# Patient Record
Sex: MALE | Race: WHITE | NOT HISPANIC OR LATINO | Employment: FULL TIME | URBAN - METROPOLITAN AREA
[De-identification: names, ages, dates, MRNs, and addresses within clinical notes are randomized per-mention and may not be internally consistent; named-entity substitution may affect disease eponyms.]

---

## 2020-09-03 ENCOUNTER — VIRTUAL VISIT (OUTPATIENT)
Dept: FAMILY MEDICINE | Facility: OTHER | Age: 30
End: 2020-09-03
Payer: COMMERCIAL

## 2020-09-03 PROCEDURE — 99421 OL DIG E/M SVC 5-10 MIN: CPT | Performed by: PHYSICIAN ASSISTANT

## 2020-09-03 NOTE — PROGRESS NOTES
"Date: 2020 09:02:18  Clinician: Pérez Kang  Clinician NPI: [id number]  Patient: Urbano Pereira  Patient : 1990  Patient Address: [address] [place] [address]  Patient Phone: [phone]  Visit Protocol: URI  Patient Summary:  Urbano is a 30 year old ( : 1990 ) male who initiated a Visit for cold, sinus infection, or influenza. When asked the question \"Please sign me up to receive news, health information and promotions from [employer].\", Urbano responded \"No\".    Urbano states his symptoms started gradually 7-9 days ago. After his symptoms started, they improved and then got worse again.   His symptoms consist of facial pain or pressure, nausea, wheezing, and a cough. He is experiencing mild difficulty breathing with activities but can speak normally in full sentences.   Symptom details     Cough: Urbano coughs almost every minute and his cough is not more bothersome at night. Phlegm comes into his throat when he coughs. He believes his cough is caused by post-nasal drip. The color of the phlegm is green and yellow.     Wheezing: Urbano has not ever been diagnosed with asthma. Additional wheezing details as reported by the patient (free text): Mainly when I exhale, I notice a wheeze and a rattle, like a deep vibration, in my chest. If I try to make it happen it makes me cough. The wheeze and rattle wasn't there when the cough started. It developed after approx. 6-7 days of having the cough. If I strain to do something there's wheezing and that deep vibration.       Facial pain or pressure: The facial pain or pressure feels worse when bending over or leaning forward.      Urbano denies having ear pain, anosmia, fever, vomiting, rhinitis, sore throat, teeth pain, ageusia, diarrhea, nasal congestion, headache, chills, malaise, and myalgias. He also denies taking antibiotic medication in the past month, having recent facial or sinus surgery in the past 60 days, and having a " sinus infection within the past year.   Precipitating events  He has not recently been exposed to someone with influenza. Urbano has been in close contact with the following high risk individuals: children under the age of 5.   Pertinent COVID-19 (Coronavirus) information  In the past 14 days, Urbano has not worked in a congregate living setting.   He either works or volunteers as a healthcare worker or a , or works or volunteers in a healthcare facility. 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   Urbano also has not lived in a congregate living setting in the past 14 days. He does not live with a healthcare worker.   Urbano has not had a close contact with a laboratory-confirmed COVID-19 patient within 14 days of symptom onset.   Since December 2019, Urbano and has not had upper respiratory infection or influenza-like illness. Has not been diagnosed with lab-confirmed COVID-19 test   Pertinent medical history  Urbano does not need a return to work/school note.   Weight: 265 lbs   Urbano does not smoke or use smokeless tobacco.   Additional information as reported by the patient (free text): I had a low grade fever initially when symptoms started but have not since day 2-3. I took a Covid swab test on Monday, 8/31 which was 5 days after the cough had begun. It came back negative. The cough has persisted and the wheeze and rattle began the day after the test.   Weight: 265 lbs  A synchronous phone visit was initiated by the provider for the following reason: wheezing and worsening symptoms    MEDICATIONS: Claritin RediTabs oral, Advil oral, Mucinex D Maximum Strength oral, ALLERGIES: NKDA  Clinician Response:  Dear Urbano,   Your symptoms show that you may have coronavirus (COVID-19). This illness can cause fever, cough and trouble breathing. Many people  "get a mild case and get better on their own. Some people can get very sick.  What should I do?  We would like to test you for this virus.   1. Please call [phone] to schedule your visit. Explain that you were referred by [facility] to have a COVID-19 test. Be ready to share your [facility] visit ID number.  The following will serve as your written order for this COVID Test, ordered by me, for the indication of suspected COVID [Z20.828]: The test will be ordered in [employer], our electronic health record, after you are scheduled. It will show as ordered and authorized by Daryl Brown MD.  Order: COVID-19 (Coronavirus) PCR for SYMPTOMATIC testing from [facility].      2. When it's time for your COVID test:  Stay at least 6 feet away from others. (If someone will drive you to your test, stay in the backseat, as far away from the  as you can.)   Cover your mouth and nose with a mask, tissue or washcloth.  Go straight to the testing site. Don't make any stops on the way there or back.      3.Starting now: Stay home and away from others (self-isolate) until:   You've had no fever---and no medicine that reduces fever---for one full day (24 hours). And...   Your other symptoms have gotten better. For example, your cough or breathing has improved. And...   At least 10 days have passed since your symptoms started.       During this time, don't leave the house except for testing or medical care.   Stay in your own room, even for meals. Use your own bathroom if you can.   Stay away from others in your home. No hugging, kissing or shaking hands. No visitors.  Don't go to work, school or anywhere else.    Clean \"high touch\" surfaces often (doorknobs, counters, handles, etc.). Use a household cleaning spray or wipes. You'll find a full list of  on the EPA website: www.epa.gov/pesticide-registration/list-n-disinfectants-use-against-sars-cov-2.   Cover your mouth and nose with a mask, tissue or washcloth to avoid spreading germs.  " Wash your hands and face often. Use soap and water.  Caregivers in these groups are at risk for severe illness due to COVID-19:  o People 65 years and older  o People who live in a nursing home or long-term care facility  o People with chronic disease (lung, heart, cancer, diabetes, kidney, liver, immunologic)  o People who have a weakened immune system, including those who:   Are in cancer treatment  Take medicine that weakens the immune system, such as corticosteroids  Had a bone marrow or organ transplant  Have an immune deficiency  Have poorly controlled HIV or AIDS  Are obese (body mass index of 40 or higher)  Smoke regularly   o Caregivers should wear gloves while washing dishes, handling laundry and cleaning bedrooms and bathrooms.  o Use caution when washing and drying laundry: Don't shake dirty laundry, and use the warmest water setting that you can.  o For more tips, go to www.cdc.gov/coronavirus/2019-ncov/downloads/10Things.pdf.    4.Sign up for [employer]. We know it's scary to hear that you might have COVID-19. We want to track your symptoms to make sure you're okay over the next 2 weeks. Please look for an email from [employer]---this is a free, online program that we'll use to keep in touch. To sign up, follow the link in the email. Learn more at [email or web address]  How can I take care of myself?   Get lots of rest. Drink extra fluids (unless a doctor has told you not to).   Take Tylenol (acetaminophen) for fever or pain. If you have liver or kidney problems, ask your family doctor if it's okay to take Tylenol.   Adults can take either:    650 mg (two 325 mg pills) every 4 to 6 hours, or...   1,000 mg (two 500 mg pills) every 8 hours as needed.    Note: Don't take more than 3,000 mg in one day. Acetaminophen is found in many medicines (both prescribed and over-the-counter medicines). Read all labels to be sure you don't take too much.   For children, check the Tylenol bottle for the  right dose. The dose is based on the child's age or weight.    If you have other health problems (like cancer, heart failure, an organ transplant or severe kidney disease): Call your specialty clinic if you don't feel better in the next 2 days.       Know when to call 911. Emergency warning signs include:    Trouble breathing or shortness of breath Pain or pressure in the chest that doesn't go away Feeling confused like you haven't felt before, or not being able to wake up Bluish-colored lips or face.  Where can I get more information?    [employer] -- About COVID-19: [email or web address]   CDC -- What to Do If You're Sick: www.cdc.gov/coronavirus/2019-ncov/about/steps-when-sick.html   [facility] -- Ending Home Isolation: www.cdc.gov/coronavirus/2019-ncov/hcp/disposition-in-home-patients.html   [facility] -- Caring for Someone: www.cdc.gov/coronavirus/2019-ncov/if-you-are-sick/care-for-someone.html   [facility] -- Interim Guidance for Hospital Discharge to Home: www.[facility].Critical access hospital.[place]./diseases/coronavirus/hcp/hospdischarge.pdf   [facility] clinical trials (COVID-19 research studies): clinicalaffairs.[facility]-clinical-trials    Below are the COVID-19 hotlines at the [place] [facility] ([facility]). Interpreters are available.    For health questions: Call 066-154-5552 or 1-501.994.9057 (7 a.m. to 7 p.m.) For questions about schools and childcare: Call 374-309-6745 or 1-408.682.8582 (7 a.m. to 7 p.m.)    Diagnosis: Wheezing  Diagnosis ICD: R06.2  Triage Notes: I reviewed the patient's history, verified their identity, and explained the Visit process.    Patient has productive cough and wheezing.   We have decided to try a course of antibiotics and inhaler.  If symptoms persist or worsen then getting seen in urgent care and retesting for covid seem warranted.  Synchronous Triage: phone, status: completed, duration: 47 seconds  Prescription: azithromycin (Zithromax) 500 mg oral tablet 3 tablet, 3 days  supply. Take 1 tablet by mouth 1 time per day for 3 days. Refills: 0, Refill as needed: no, Allow substitutions: yes  Prescription: albuterol sulfate (Proventil HFA) 90 mcg/actuation inhalation HFA aerosol inhaler 1 200 inhalation aerosol with adapter (proventil hfa or equivalent), 0 days supply. Inhale 2 puffs every 4 hours as needed. Refills: 0, Refill as needed: no, Allow substitutions: yes  Pharmacy: [facility] - [phone] - [address] [place], [place] [id number]

## 2024-04-20 ENCOUNTER — HOSPITAL ENCOUNTER (EMERGENCY)
Facility: HOSPITAL | Age: 34
Discharge: HOME | End: 2024-04-20
Attending: FAMILY MEDICINE | Admitting: FAMILY MEDICINE
Payer: COMMERCIAL

## 2024-04-20 ENCOUNTER — APPOINTMENT (OUTPATIENT)
Dept: CT IMAGING | Facility: HOSPITAL | Age: 34
End: 2024-04-20
Attending: FAMILY MEDICINE
Payer: COMMERCIAL

## 2024-04-20 ENCOUNTER — APPOINTMENT (OUTPATIENT)
Dept: MRI IMAGING | Facility: HOSPITAL | Age: 34
End: 2024-04-20
Attending: FAMILY MEDICINE
Payer: COMMERCIAL

## 2024-04-20 VITALS
SYSTOLIC BLOOD PRESSURE: 102 MMHG | WEIGHT: 237 LBS | BODY MASS INDEX: 29.47 KG/M2 | HEIGHT: 75 IN | RESPIRATION RATE: 18 BRPM | HEART RATE: 66 BPM | DIASTOLIC BLOOD PRESSURE: 59 MMHG | TEMPERATURE: 98.8 F | OXYGEN SATURATION: 97 %

## 2024-04-20 DIAGNOSIS — R20.2 PARESTHESIA: ICD-10-CM

## 2024-04-20 LAB
ANION GAP SERPL CALCULATED.3IONS-SCNC: 11 MMOL/L (ref 7–15)
APTT PPP: 26 SECONDS (ref 22–38)
BASOPHILS # BLD AUTO: 0 10E3/UL (ref 0–0.2)
BASOPHILS NFR BLD AUTO: 1 %
BUN SERPL-MCNC: 21.2 MG/DL (ref 6–20)
CALCIUM SERPL-MCNC: 9.3 MG/DL (ref 8.6–10)
CHLORIDE SERPL-SCNC: 103 MMOL/L (ref 98–107)
CREAT SERPL-MCNC: 0.83 MG/DL (ref 0.67–1.17)
DEPRECATED HCO3 PLAS-SCNC: 23 MMOL/L (ref 22–29)
EGFRCR SERPLBLD CKD-EPI 2021: >90 ML/MIN/1.73M2
EOSINOPHIL # BLD AUTO: 0.2 10E3/UL (ref 0–0.7)
EOSINOPHIL NFR BLD AUTO: 3 %
ERYTHROCYTE [DISTWIDTH] IN BLOOD BY AUTOMATED COUNT: 11.5 % (ref 10–15)
GLUCOSE BLDC GLUCOMTR-MCNC: 134 MG/DL (ref 70–99)
GLUCOSE SERPL-MCNC: 127 MG/DL (ref 70–99)
HCT VFR BLD AUTO: 40.4 % (ref 40–53)
HGB BLD-MCNC: 14 G/DL (ref 13.3–17.7)
HOLD SPECIMEN: NORMAL
IMM GRANULOCYTES # BLD: 0 10E3/UL
IMM GRANULOCYTES NFR BLD: 0 %
INR PPP: 0.95 (ref 0.85–1.15)
LYMPHOCYTES # BLD AUTO: 2 10E3/UL (ref 0.8–5.3)
LYMPHOCYTES NFR BLD AUTO: 30 %
MCH RBC QN AUTO: 30.8 PG (ref 26.5–33)
MCHC RBC AUTO-ENTMCNC: 34.7 G/DL (ref 31.5–36.5)
MCV RBC AUTO: 89 FL (ref 78–100)
MONOCYTES # BLD AUTO: 0.5 10E3/UL (ref 0–1.3)
MONOCYTES NFR BLD AUTO: 7 %
NEUTROPHILS # BLD AUTO: 3.9 10E3/UL (ref 1.6–8.3)
NEUTROPHILS NFR BLD AUTO: 59 %
NRBC # BLD AUTO: 0 10E3/UL
NRBC BLD AUTO-RTO: 0 /100
PLATELET # BLD AUTO: 184 10E3/UL (ref 150–450)
POTASSIUM SERPL-SCNC: 4.2 MMOL/L (ref 3.4–5.3)
RBC # BLD AUTO: 4.55 10E6/UL (ref 4.4–5.9)
SODIUM SERPL-SCNC: 137 MMOL/L (ref 135–145)
TROPONIN T SERPL HS-MCNC: <6 NG/L
WBC # BLD AUTO: 6.5 10E3/UL (ref 4–11)

## 2024-04-20 PROCEDURE — 85025 COMPLETE CBC W/AUTO DIFF WBC: CPT | Performed by: FAMILY MEDICINE

## 2024-04-20 PROCEDURE — 82962 GLUCOSE BLOOD TEST: CPT

## 2024-04-20 PROCEDURE — 70553 MRI BRAIN STEM W/O & W/DYE: CPT

## 2024-04-20 PROCEDURE — 258N000003 HC RX IP 258 OP 636: Performed by: FAMILY MEDICINE

## 2024-04-20 PROCEDURE — 84484 ASSAY OF TROPONIN QUANT: CPT | Performed by: FAMILY MEDICINE

## 2024-04-20 PROCEDURE — 96360 HYDRATION IV INFUSION INIT: CPT | Mod: 59

## 2024-04-20 PROCEDURE — 70496 CT ANGIOGRAPHY HEAD: CPT

## 2024-04-20 PROCEDURE — 93005 ELECTROCARDIOGRAM TRACING: CPT | Performed by: FAMILY MEDICINE

## 2024-04-20 PROCEDURE — 250N000011 HC RX IP 250 OP 636: Performed by: FAMILY MEDICINE

## 2024-04-20 PROCEDURE — 99285 EMERGENCY DEPT VISIT HI MDM: CPT | Mod: 25

## 2024-04-20 PROCEDURE — 99207 PR NO BILLABLE SERVICE THIS VISIT: CPT | Performed by: INTERNAL MEDICINE

## 2024-04-20 PROCEDURE — 255N000002 HC RX 255 OP 636: Performed by: EMERGENCY MEDICINE

## 2024-04-20 PROCEDURE — 85730 THROMBOPLASTIN TIME PARTIAL: CPT | Performed by: FAMILY MEDICINE

## 2024-04-20 PROCEDURE — A9585 GADOBUTROL INJECTION: HCPCS | Performed by: EMERGENCY MEDICINE

## 2024-04-20 PROCEDURE — 96361 HYDRATE IV INFUSION ADD-ON: CPT

## 2024-04-20 PROCEDURE — 36415 COLL VENOUS BLD VENIPUNCTURE: CPT | Performed by: FAMILY MEDICINE

## 2024-04-20 PROCEDURE — 85610 PROTHROMBIN TIME: CPT | Performed by: FAMILY MEDICINE

## 2024-04-20 PROCEDURE — 80048 BASIC METABOLIC PNL TOTAL CA: CPT | Performed by: FAMILY MEDICINE

## 2024-04-20 RX ORDER — IOPAMIDOL 755 MG/ML
67 INJECTION, SOLUTION INTRAVASCULAR ONCE
Status: COMPLETED | OUTPATIENT
Start: 2024-04-20 | End: 2024-04-20

## 2024-04-20 RX ORDER — GADOBUTROL 604.72 MG/ML
10 INJECTION INTRAVENOUS ONCE
Status: COMPLETED | OUTPATIENT
Start: 2024-04-20 | End: 2024-04-20

## 2024-04-20 RX ADMIN — SODIUM CHLORIDE 1000 ML: 9 INJECTION, SOLUTION INTRAVENOUS at 05:30

## 2024-04-20 RX ADMIN — IOPAMIDOL 67 ML: 755 INJECTION, SOLUTION INTRAVENOUS at 04:15

## 2024-04-20 RX ADMIN — GADOBUTROL 10 ML: 604.72 INJECTION INTRAVENOUS at 07:02

## 2024-04-20 ASSESSMENT — COLUMBIA-SUICIDE SEVERITY RATING SCALE - C-SSRS
6. HAVE YOU EVER DONE ANYTHING, STARTED TO DO ANYTHING, OR PREPARED TO DO ANYTHING TO END YOUR LIFE?: NO
2. HAVE YOU ACTUALLY HAD ANY THOUGHTS OF KILLING YOURSELF IN THE PAST MONTH?: NO
1. IN THE PAST MONTH, HAVE YOU WISHED YOU WERE DEAD OR WISHED YOU COULD GO TO SLEEP AND NOT WAKE UP?: NO

## 2024-04-20 ASSESSMENT — ACTIVITIES OF DAILY LIVING (ADL)
ADLS_ACUITY_SCORE: 35
ADLS_ACUITY_SCORE: 33
ADLS_ACUITY_SCORE: 35

## 2024-04-20 NOTE — ED PROVIDER NOTES
EMERGENCY DEPARTMENT SIGN OUT NOTE        ED COURSE AND MEDICAL DECISION MAKING  Patient was signed out to me by Dr Gary Ramos at 0700    In brief, Urbano Pereira is a 33 year old male who initially presented with right sided paresthesias     At time of sign out, disposition was pending MRI, recommendations from Stroke Neurology    0735 MRI resulted without any acute findings.  Ongoing mild tingling to right hand but improving.  Patient updated and page sent out to Stroke Neurology  0810 discussed with stroke neurology.  If patient is having ongoing symptoms could follow-up with general neurology, otherwise we will plan on discharge either way.  0815 patient updated.  He is reassured with results.  Notes some ongoing mild tingling to his right hand and I discussed follow-up with general neurology.  We discussed signs and symptoms that would warrant return especially any strokelike symptoms  FINAL IMPRESSION    1. Paresthesia        ED MEDS  Medications   iopamidol (ISOVUE-370) solution 67 mL (67 mLs Intravenous $Given 4/20/24 0415)     And   sodium chloride 0.9 % bag for CT scan flush use (100 mLs As instructed Not Given 4/20/24 0419)   sodium chloride 0.9% BOLUS 1,000 mL (0 mLs Intravenous Stopped 4/20/24 0710)   gadobutrol (GADAVIST) injection 10 mL (10 mLs Intravenous $Given 4/20/24 0702)       LAB  Labs Ordered and Resulted from Time of ED Arrival to Time of ED Departure   BASIC METABOLIC PANEL - Abnormal       Result Value    Sodium 137      Potassium 4.2      Chloride 103      Carbon Dioxide (CO2) 23      Anion Gap 11      Urea Nitrogen 21.2 (*)     Creatinine 0.83      GFR Estimate >90      Calcium 9.3      Glucose 127 (*)    GLUCOSE BY METER - Abnormal    GLUCOSE BY METER POCT 134 (*)    INR - Normal    INR 0.95     PARTIAL THROMBOPLASTIN TIME - Normal    aPTT 26     TROPONIN T, HIGH SENSITIVITY - Normal    Troponin T, High Sensitivity <6     GLUCOSE MONITOR NURSING POCT   CBC WITH PLATELETS AND  DIFFERENTIAL    WBC Count 6.5      RBC Count 4.55      Hemoglobin 14.0      Hematocrit 40.4      MCV 89      MCH 30.8      MCHC 34.7      RDW 11.5      Platelet Count 184      % Neutrophils 59      % Lymphocytes 30      % Monocytes 7      % Eosinophils 3      % Basophils 1      % Immature Granulocytes 0      NRBCs per 100 WBC 0      Absolute Neutrophils 3.9      Absolute Lymphocytes 2.0      Absolute Monocytes 0.5      Absolute Eosinophils 0.2      Absolute Basophils 0.0      Absolute Immature Granulocytes 0.0      Absolute NRBCs 0.0         RADIOLOGY    MR Brain w/o & w Contrast   Final Result   IMPRESSION:   1.  No evidence of acute intracranial hemorrhage, mass effect, or infarction.   2.  Paranasal sinus disease.      CTA Head Neck with Contrast   Final Result   IMPRESSION:    HEAD CT:   1.  Normal head CT.      HEAD CTA:    1.  Normal CTA [place].      NECK CTA:   1.  Normal neck CTA.       Dr. Ramos  was contacted by me on 4/20/2024 4:08 AM CDT  with the preliminary report.          DISCHARGE MEDS  New Prescriptions    No medications on file         Rocio Viveros DO  Emergency Medicine  [facility] EMERGENCY DEPARTMENT  [address] [place] [id number]  [phone]     Rocio Viveros DO  04/20/24 0816

## 2024-04-20 NOTE — PROGRESS NOTES
"  [facility]    Stroke Telephone Note    I was called by Rocio Viveros on 04/20/24 regarding patient Urbano Pereira. The patient is a 33 year old male without significant past medical history.    He presented to the [facility] ED around 0345 AM this morning with R face/forearm/foot tingling>numbness. Symptoms persistent to the R 5th digit at this time.     Vitals  BP: 106/56   Pulse: 82   Resp: 18   Temp: 98.8  F (37.1  C)   Weight: 107.5 kg (237 lb)    Imaging Findings  HEAD CT:  1.  Normal head CT.     HEAD CTA:   1.  Normal CTA [place].     NECK CTA:  1.  Normal neck CTA.    MRI BRAIN:  1.  No evidence of acute intracranial hemorrhage, mass effect, or infarction.  2.  Paranasal sinus disease.    Impression  R sided tingling>numbness, MRI negative for stroke, possible complex aura    Recommendations  -if persistent symptoms would follow-up with general neurology, no further stroke work-up needed so we will sign off. Please contact us with any concerns.    Case discussed with vascular neurology attending Dr. Lanier.    My recommendations are based on the information provided over the phone by Urbano Pereira's in-person providers. They are not intended to replace the clinical judgment of his in-person providers. I was not requested to personally see or examine the patient at this time.     Marli Bearden PA-C  Vascular Neurology    To page me or covering stroke neurology team member, click here: [employer]  Choose \"On Call\" tab at top, then select \"NEUROLOGY/ALL SITES\" from middle drop-down box, press Enter, then look for \"stroke\" or \"telestroke\" for your site.   "

## 2024-04-20 NOTE — CONSULTS
[facility]     Stroke Code Note          History of Present Illness     Chief Complaint: Numbness      Urbano Pereira is a 33 year old right-handed male with no prior medical problems who presents with right face, right forearm and right foot numbness that started at 3 AM. More tingling rather than numbness. He did have some alcohol and marijuana tonight. Symptoms progressed gradually and have been persistent, so he came in. Stroke code was activated for the same.    PSH: tendon repair in left hand  FH: migraines, strokes, CAD  SH: uses nicotine, alcohol: yes, marijuana: yes  Allergies: none         Past Medical History     Stroke risk factors: none    Preadmission antithrombotic regimen: none    Modified Donovan Score (Pre-morbid)  0 - No symptoms.                   Assessment and Plan       1.  Possible stroke vs complicated aura     Intravenous Thrombolysis  Not given due to:   - minor/isolated/quickly resolving symptoms     Endovascular Treatment  Not initiated due to absence of proximal vessel occlusion     Plan:  - Neurochecks and Vital Signs every 4 hours   - Permissive HTN; goal SBP < 220 mmHg  - Statin: None for now  - MRI Brain with and without contrast .  - Telemetry, EKG  - Bedside Glucose Monitoring  - A1c, Lipid Panel, Troponin x 3  - PT/OT/SLP  - Stroke Education  - Smoking Cessation  - Euthermia, Euglycemia   - If any stroke on MRI, can do further stroke work up and start on ASA.     ___________________________________________________________________    Mekhi Keller MD  ___________________________________________________________________        Imaging/Labs   (personally reviewed )    CT head: no acute IC changes.  CTA head/neck: no LVO         Physical Examination     BP: 134/86   Pulse: 91   Resp: 20   Temp: 98.8  F (37.1  C)   Temp src: Temporal   SpO2: 96 %   O2 Device: None (Room air)   Weight: 107.5 kg (237 lb)    Wt Readings from Last 2 Encounters:   04/20/24  107.5 kg (237 lb)       General Exam  General:  patient lying in bed without any acute distress    HEENT:  normocephalic/atraumatic  Cardio:  RRR  Pulmonary:  no respiratory distress  Abdomen:  non-distended  Extremities:  no edema  Skin:  intact     Neuro Exam  Mental Status:  alert, oriented x 3, follows commands, speech clear and fluent, naming and repetition normal  Cranial Nerves:  EOMI with normal smooth pursuit, facial movements symmetric, hearing not formally tested but intact to conversation, no dysarthria, shoulder shrug equal bilaterally, tongue protrusion midline  Motor:  no abnormal movements, able to move all limbs antigravity spontaneously with no signs of hemiparesis observed, no pronator drift   Reflexes:  unable to test (telestroke)  Sensory:  light touch sensation intact and symmetric throughout upper and lower extremities  Coordination:  normal finger-to-nose and heel-to-shin bilaterally without dysmetria, rapid alternating movements symmetric  Station/Gait:  unable to test due to telestroke        Stroke Scales       NIHSS  1a. Level of Consciousness 0-->Alert, keenly responsive   1b. LOC Questions 0-->Answers both questions correctly   1c. LOC Commands 0-->Performs both tasks correctly   2.   Best Gaze 0-->Normal   3.   Visual 0-->No visual loss   4.   Facial Palsy 0-->Normal symmetrical movements   5a. Motor Arm, Left 0-->No drift, limb holds 90 (or 45) degrees for full 10 secs   5b. Motor Arm, Right 0-->No drift, limb holds 90 (or 45) degrees for full 10 secs   6a. Motor Leg, Left 0-->No drift, leg holds 30 degree position for full 5 secs   6b. Motor Leg, right 0-->No drift, leg holds 30 degree position for full 5 secs   7.   Limb Ataxia 0-->Absent   8.   Sensory 0-->Normal, no sensory loss   9.   Best Language 0-->No aphasia, normal   10. Dysarthria 0-->Normal   11. Extinction and Inattention  0-->No abnormality   Total 0 (04/20/24 7350)            Labs     CBC  Lab Results   Component Value  "Date    HGB 14.0 04/20/2024    HCT 40.4 04/20/2024    WBC 6.5 04/20/2024     04/20/2024       BMP  Lab Results   Component Value Date     04/20/2024    POTASSIUM 4.2 04/20/2024    CHLORIDE 103 04/20/2024    CO2 23 04/20/2024    BUN 21.2 (H) 04/20/2024    CR 0.83 04/20/2024     (H) 04/20/2024    JASON 9.3 04/20/2024       INR  INR   Date Value Ref Range Status   04/20/2024 0.95 0.85 - 1.15 Final       Data   Stroke Code Data  (for stroke code with tele)  Stroke code activated 04/20/24  0358   First stroke provider response 04/20/24  0402   Video start time 04/20/24  0402   Video end time 04/20/24  0410   Last known normal 04/20/24  0300   Time of discovery (or onset of symptoms)  04/20/24  0300   Head CT read by Stroke Neuro Provider 04/20/24  0410   Was stroke code de-escalated? Yes  04/20/24  0449     Telestroke Service Details  Type of service telemedicine diagnostic assessment of acute neurological changes   Reason telemedicine is appropriate patient requires assessment with a specialist for diagnosis and treatment of neurological symptoms   Mode of transmission secure interactive audio and video communication per Kimberly   Originating site (patient location) [facility]    Distant site (provider location) Provider remote site        Clinically Significant Risk Factors Present on Admission                       # Overweight: Estimated body mass index is 29.62 kg/m  as calculated from the following:    Height as of this encounter: 1.905 m (6' 3\").    Weight as of this encounter: 107.5 kg (237 lb).             Time Spent on this Encounter   Billing: I personally examined and evaluated the patient today. At the time of my evaluation and management the patient was critical condition today due to stroke code. I personally managed stroke code. Key decisions made today included TNK decision, imaging review.. I spent a total of 45 minutes providing critical care services, " evaluating the patient, directing care and reviewing laboratory values and radiologic reports.

## 2024-04-20 NOTE — DISCHARGE INSTRUCTIONS
As discussed, luckily there are no signs of any strokes or severe abnormalities on your imaging  Given your ongoing tingling in your hand, I did place a referral for general neurology for follow-up.[facility] will call you to coordinate your care as prescribed by your provider. If you don't hear from a representative within 2 business days, please call [phone].   If any acute worsening of symptoms, specially any strokelike symptoms such as inability to move 1 side of your body, confusion, word finding difficulties, slurred speech, return immediately to the emergency room

## 2024-04-20 NOTE — ED TRIAGE NOTES
Patient reports right sided arm, hand, face and bottom of foot numbness that started at 3am.   .   Provider called to triage for neuro eval.  Tier 1 stroke code called by Dr. Ramos.     Triage Assessment (Adult)       Row Name 04/20/24 0358          Triage Assessment    Airway WDL WDL        Respiratory WDL    Respiratory WDL WDL        Skin Circulation/Temperature WDL    Skin Circulation/Temperature WDL WDL

## 2024-04-20 NOTE — ED PROVIDER NOTES
EMERGENCY DEPARTMENT ENCOUNTER      NAME: Urbano Pereira  AGE: 33 year old male  YOB: 1990  MRN: [id number]  EVALUATION DATE & TIME: No admission date for patient encounter.    PCP: No primary care provider on file.    ED PROVIDER: Gary Ramos M.D.    Chief Complaint   Patient presents with    Numbness       FINAL IMPRESSION:  1. Paresthesia        ED COURSE & MEDICAL DECISION MAKING:    Pertinent Labs & Imaging studies independently interpreted by me. (See chart for details)  3:54 AM Called to triage for neuro evaluation. Patient seen and examined, external records reviewed.  4:03 AM Discussed patient with Stroke Neurology.  4:07 AM Discussed patient with [facility].    ED Course as of 04/20/24 0706   Sat Apr 20, 2024   0359 Seen and examined in triage as stroke code.  Presents after waking at 3 AM with right arm, leg, and face numbness.  On initial exam here, decreased sensation of the right lower face as well as the ulnar aspect of the right arm.  No drift, patient reports some word finding difficulty although speech seems fluent and with no slurring.  Stroke code initiated.   0407 Care discussed with Dr. Collier, radiology.  Non-contrast head CT negative for acute findings.   0417 Discussed CTA with radiologist, no LVO.   0442 Labs ordered and independently interpreted by me with negative normal BNP, negative troponin, normal CBC.   0520 Updated patient with findings so far and plan for MRI.  He still feels like his arm and leg are little tingly and felt like his legs were weak when he stood up, continue to monitor.     7:03 AM Signout to oncoming provider to follow-up on MRI and disposition.    At the conclusion of the encounter I discussed the results of all of the tests and the disposition. The questions were answered. The patient or family acknowledged understanding and was agreeable with the care plan.     Medical Decision Making  Obtained supplemental history:Supplemental  history obtained?: Family Member/Significant Other  Reviewed external records: External records reviewed?: Documented in chart  Care impacted by chronic illness:Mental Health  Care significantly affected by social determinants of health:Access to Affordable Health Care  Did you consider but not order tests?: Work up considered but not performed and documented in chart, if applicable  Did you interpret images independently?: Independent interpretation of ECG and images noted in documentation, when applicable.  Consultation discussion with other provider:Did you involve another provider (consultant, , pharmacy, etc.)?: I discussed the care with another health care provider, see documentation for details.  Admission considered. Patient was signed out to the oncoming physician, disposition pending.    EKG:    Performed at: 4:21 AM  Impression: Normal EKG  Rate: 85  Rhythm: Sinus  Axis: Normal  IN Interval: 146  QRS Interval: 108  QTc Interval: 449  ST Changes: No acute ischemic changes  Comparison: No prior    I have independently reviewed and interpreted the EKG(s) documented above.    PROCEDURES:       MEDICATIONS GIVEN IN THE EMERGENCY:  Medications   iopamidol (ISOVUE-370) solution 67 mL (67 mLs Intravenous $Given 4/20/24 6148)     And   sodium chloride 0.9 % bag for CT scan flush use (100 mLs As instructed Not Given 4/20/24 9155)   sodium chloride 0.9% BOLUS 1,000 mL (1,000 mLs Intravenous $New Bag 4/20/24 0827)   gadobutrol (GADAVIST) injection 10 mL (10 mLs Intravenous $Given 4/20/24 2353)       NEW PRESCRIPTIONS STARTED AT TODAY'S ER VISIT  New Prescriptions    No medications on file       =================================================================    HPI    Patient information was obtained from: Patient and significant other.      Urbano Pereira is a 33 year old male with a pertinent history of right knee injury who presents to this ED by walk in for evaluation of numbness.    The patient reports rapid  "heart rate of about 140 bpm and feeling cold while he was laying in bed tonight about 30 minutes ago. He states that he then developed paraesthesias in the bottom of his right foot, right hand, and right side of his face. The sensation changes have persisted, though they have somewhat improved upon arrival to the ED. He additionally reports slight word-finding difficulty and his significant other reports right-sided weakness.      REVIEW OF SYSTEMS   Review of Systems   All other systems reviewed and negative    PAST MEDICAL HISTORY:  No past medical history on file.    PAST SURGICAL HISTORY:  No past surgical history on file.    CURRENT MEDICATIONS:    No current facility-administered medications for this encounter.     No current outpatient medications on file.       ALLERGIES:  No Known Allergies    FAMILY HISTORY:  No family history on file.    SOCIAL HISTORY:   Social History     Socioeconomic History    Marital status: Single       VITALS:  /56   Pulse 74   Temp 98.8  F (37.1  C) (Temporal)   Resp 18   Ht 1.905 m (6' 3\")   Wt 107.5 kg (237 lb)   SpO2 96%   BMI 29.62 kg/m      PHYSICAL EXAM:  Physical Exam  Vitals and nursing note reviewed.   Constitutional:       Appearance: Normal appearance.   HENT:      Head: Normocephalic and atraumatic.      Right Ear: External ear normal.      Left Ear: External ear normal.      Nose: Nose normal.      Mouth/Throat:      Mouth: Mucous membranes are moist.   Eyes:      Extraocular Movements: Extraocular movements intact.      Conjunctiva/sclera: Conjunctivae normal.      Pupils: Pupils are equal, round, and reactive to light.   Cardiovascular:      Rate and Rhythm: Normal rate and regular rhythm.   Pulmonary:      Effort: Pulmonary effort is normal.      Breath sounds: Normal breath sounds. No wheezing or rales.   Abdominal:      General: Abdomen is flat. There is no distension.      Palpations: Abdomen is soft.      Tenderness: There is no abdominal " tenderness. There is no guarding.   Musculoskeletal:         General: Normal range of motion.      Cervical back: Normal range of motion and neck supple.      Right lower leg: No edema.      Left lower leg: No edema.   Lymphadenopathy:      Cervical: No cervical adenopathy.   Skin:     General: Skin is warm and dry.   Neurological:      Mental Status: He is alert and oriented to person, place, and time. Mental status is at baseline.      Sensory: Sensory deficit present.      Comments: Decreased sensation of right lower face and ulnar aspect of right arm.   Psychiatric:         Mood and Affect: Mood normal.         Behavior: Behavior normal.         Thought Content: Thought content normal.     [employer] Stroke Scale  Exam Interval: Baseline   Score    Level of consciousness: (0)   Alert, keenly responsive    LOC questions: (0)   Answers both questions correctly    LOC commands: (0)   Performs both tasks correctly    Best gaze: (0)   Normal    Visual: (0)   No visual loss    Facial palsy: (0)   Normal symmetrical movements    Motor arm (left): (0)   No drift    Motor arm (right): (0)   No drift    Motor leg (left): (0)   No drift    Motor leg (right): (0)   No drift    Limb ataxia: (0)   Absent    Sensory: (1)   Mild to moderate sensory loss    Best language: (0)   Normal- no aphasia    Dysarthria: (0)   Normal    Extinction and inattention: (0)   No abnormality        Total Score:  1     LAB:  All pertinent labs reviewed and interpreted.  Results for orders placed or performed during the hospital encounter of 04/20/24   CTA Head Neck with Contrast    Impression    IMPRESSION:   HEAD CT:  1.  Normal head CT.    HEAD CTA:   1.  Normal CTA [facility] [place] [facility] [place].    NECK CTA:  1.  Normal neck CTA.     Dr. Ramos  was contacted by me on 4/20/2024 4:08 AM CDT  with the preliminary report.   Basic metabolic panel   Result Value Ref Range    Sodium 137 135 - 145 mmol/L    Potassium 4.2 3.4 - 5.3 mmol/L     Chloride 103 98 - 107 mmol/L    Carbon Dioxide (CO2) 23 22 - 29 mmol/L    Anion Gap 11 7 - 15 mmol/L    Urea Nitrogen 21.2 (H) 6.0 - 20.0 mg/dL    Creatinine 0.83 0.67 - 1.17 mg/dL    GFR Estimate >90 >60 mL/min/1.73m2    Calcium 9.3 8.6 - 10.0 mg/dL    Glucose 127 (H) 70 - 99 mg/dL   Result Value Ref Range    INR 0.95 0.85 - 1.15   Partial thromboplastin time   Result Value Ref Range    aPTT 26 22 - 38 Seconds   Result Value Ref Range    Troponin T, High Sensitivity <6 <=22 ng/L   CBC with platelets and differential   Result Value Ref Range    WBC Count 6.5 4.0 - 11.0 10e3/uL    RBC Count 4.55 4.40 - 5.90 10e6/uL    Hemoglobin 14.0 13.3 - 17.7 g/dL    Hematocrit 40.4 40.0 - 53.0 %    MCV 89 78 - 100 fL    MCH 30.8 26.5 - 33.0 pg    MCHC 34.7 31.5 - 36.5 g/dL    RDW 11.5 10.0 - 15.0 %    Platelet Count 184 150 - 450 10e3/uL    % Neutrophils 59 %    % Lymphocytes 30 %    % Monocytes 7 %    % Eosinophils 3 %    % Basophils 1 %    % Immature Granulocytes 0 %    NRBCs per 100 WBC 0 <1 /100    Absolute Neutrophils 3.9 1.6 - 8.3 10e3/uL    Absolute Lymphocytes 2.0 0.8 - 5.3 10e3/uL    Absolute Monocytes 0.5 0.0 - 1.3 10e3/uL    Absolute Eosinophils 0.2 0.0 - 0.7 10e3/uL    Absolute Basophils 0.0 0.0 - 0.2 10e3/uL    Absolute Immature Granulocytes 0.0 <=0.4 10e3/uL    Absolute NRBCs 0.0 10e3/uL   Extra Red Top Tube   Result Value Ref Range    Hold Specimen JIC    Glucose by meter   Result Value Ref Range    GLUCOSE BY METER POCT 134 (H) 70 - 99 mg/dL       RADIOLOGY:  Reviewed all pertinent imaging. Please see official radiology report.  CTA Head Neck with Contrast   Final Result   IMPRESSION:    HEAD CT:   1.  Normal head CT.      HEAD CTA:    1.  Normal CTA [place] [place].      NECK CTA:   1.  Normal neck CTA.       Dr. Ramos  was contacted by me on 4/20/2024 4:08 AM CDT  with the preliminary report.      MR Brain w/o & w Contrast    (Results Pending)       IVince, am serving as a scribe to document services  personally performed by Dr. Ramos based on my observation and the provider's statements to me. I, Gary Ramos MD attest that Vince Rodarte is acting in a scribe capacity, has observed my performance of the services and has documented them in accordance with my direction.    Gary Ramos M.D.  Emergency Medicine  [facility] EMERGENCY DEPARTMENT  [address] [place] [id number]  [phone]  Dept: [phone]       Gary Ramos MD  04/20/24 0706

## 2024-04-20 NOTE — ED NOTES
Patient returned from MRI, states minimal numbness at right hand  (4th and 5th digit) grasps equal,no drift, smile equal.alert and orient. Awaiting MRI results

## 2024-04-28 LAB
ATRIAL RATE - MUSE: 85 BPM
DIASTOLIC BLOOD PRESSURE - MUSE: 66 MMHG
INTERPRETATION ECG - MUSE: NORMAL
P AXIS - MUSE: 53 DEGREES
PR INTERVAL - MUSE: 146 MS
QRS DURATION - MUSE: 108 MS
QT - MUSE: 378 MS
QTC - MUSE: 449 MS
R AXIS - MUSE: 62 DEGREES
SYSTOLIC BLOOD PRESSURE - MUSE: 114 MMHG
T AXIS - MUSE: 44 DEGREES
VENTRICULAR RATE- MUSE: 85 BPM